# Patient Record
Sex: FEMALE | Race: WHITE | Employment: FULL TIME | URBAN - METROPOLITAN AREA
[De-identification: names, ages, dates, MRNs, and addresses within clinical notes are randomized per-mention and may not be internally consistent; named-entity substitution may affect disease eponyms.]

---

## 2018-10-23 ENCOUNTER — HOSPITAL ENCOUNTER (OUTPATIENT)
Age: 58
Discharge: HOME | End: 2018-10-25
Payer: COMMERCIAL

## 2018-10-23 PROCEDURE — G0123 SCREEN CERV/VAG THIN LAYER: HCPCS

## 2022-08-22 ENCOUNTER — OFFICE VISIT (OUTPATIENT)
Dept: PODIATRY | Age: 62
End: 2022-08-22
Payer: COMMERCIAL

## 2022-08-22 VITALS — BODY MASS INDEX: 29.88 KG/M2 | WEIGHT: 175 LBS | HEIGHT: 64 IN

## 2022-08-22 DIAGNOSIS — M79.672 LEFT FOOT PAIN: ICD-10-CM

## 2022-08-22 DIAGNOSIS — M77.41 METATARSALGIA OF BOTH FEET: ICD-10-CM

## 2022-08-22 DIAGNOSIS — M79.671 RIGHT FOOT PAIN: ICD-10-CM

## 2022-08-22 DIAGNOSIS — G57.81 NEUROMA OF THIRD INTERSPACE OF RIGHT FOOT: Primary | ICD-10-CM

## 2022-08-22 DIAGNOSIS — M77.42 METATARSALGIA OF BOTH FEET: ICD-10-CM

## 2022-08-22 DIAGNOSIS — R26.2 DIFFICULTY WALKING: ICD-10-CM

## 2022-08-22 PROCEDURE — 99203 OFFICE O/P NEW LOW 30 MIN: CPT | Performed by: PODIATRIST

## 2022-08-22 PROCEDURE — 3017F COLORECTAL CA SCREEN DOC REV: CPT | Performed by: PODIATRIST

## 2022-08-22 PROCEDURE — G8417 CALC BMI ABV UP PARAM F/U: HCPCS | Performed by: PODIATRIST

## 2022-08-22 PROCEDURE — G8427 DOCREV CUR MEDS BY ELIG CLIN: HCPCS | Performed by: PODIATRIST

## 2022-08-22 PROCEDURE — 1036F TOBACCO NON-USER: CPT | Performed by: PODIATRIST

## 2022-08-22 RX ORDER — LEVOTHYROXINE SODIUM 88 MCG
TABLET ORAL
COMMUNITY
Start: 2022-08-12

## 2022-08-22 RX ORDER — FLUOXETINE 20 MG/1
TABLET ORAL
COMMUNITY

## 2022-08-22 NOTE — PROGRESS NOTES
Patient is here for bilateral foot pain. Patient states pain in worse in right foot. Patient states pain for about 4 months.  Sabi Gonzales MD  Electronically signed by Nany Carrillo LPN on 2/21/0375 at 3:02 PM

## 2022-08-22 NOTE — PROGRESS NOTES
22     Addis Balloon    : 1960 Sex: female   Age: 60 y.o. Patient was referred by: None  Patient's PCP/Provider is:  Sabi Gonzales MD    Subjective:    Patient seen today for evaluation regarding bilateral forefoot pain. Chief Complaint   Patient presents with    Foot Pain     Bilateral foot pain        HPI: Patient stated issues been going on for several months now. She denies any recent injuries or changes in activities. Patient has tried different shoe gear with minimal improvement in symptoms. Presented today to discuss other potential treatment options available. No other additional abnormalities noted. ROS:  Const: Positives and pertinent negatives as per HPI. Musculo: Denies symptoms other than stated above. Neuro: Denies symptoms other than stated above. Skin: Denies symptoms other than stated above. Current Medications:    Current Outpatient Medications:     SYNTHROID 88 MCG tablet, , Disp: , Rfl:     FLUoxetine HCl, PMDD, 20 MG TABS, fluoxetine 20 mg tablet  Take 1 tablet every day by oral route., Disp: , Rfl:     Allergies:  No Known Allergies    Vitals:    22 1653   Weight: 175 lb (79.4 kg)   Height: 5' 4\" (1.626 m)        No past medical history on file. No family history on file. No past surgical history on file. Social History     Tobacco Use    Smoking status: Never    Smokeless tobacco: Never   Substance Use Topics    Alcohol use: Never    Drug use: Never           Diagnostic studies:    No results found. Procedures:    None    Exam:  VASCULAR: Pedal pulses palpable bilateral foot. CFT brisk digits 1 through 5 bilateral foot. NEUROLOGICAL: Epicritic sensations intact and symmetrical.  Mild paresthesias noted into the right forefoot area with medial/lateral compression performed. DERMATOLOGICAL: No edema or ecchymotic skin changes noted right forefoot region. No plantar calluses or heel fissuring noted bilateral foot.   MUSCULOSKELETAL: Adequate range of motion digital regions bilateral foot. Mild antalgic gait noted upon evaluation. Plan Per Assessment  Asiya Uribe was seen today for foot pain. 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 No acute osseous abnormalities noted. Did discuss conservative care options at this time. Did recommend purchasing OTC insoles with metatarsal head relief. Discussed additional orthopedic sandals to utilize as well to relieve current symptoms. Patient will be followed up at a later date for continued evaluation and management. Seen By:    Sridhar Cuello DPM    Electronically signed by Sridhar Cuello DPM on 8/22/2022 at 5:13 PM      This note was created using voice recognition software. The note was reviewed however may contain grammatical errors.

## 2022-09-26 ENCOUNTER — OFFICE VISIT (OUTPATIENT)
Dept: PODIATRY | Age: 62
End: 2022-09-26
Payer: COMMERCIAL

## 2022-09-26 VITALS — WEIGHT: 175 LBS | HEIGHT: 64 IN | BODY MASS INDEX: 29.88 KG/M2

## 2022-09-26 DIAGNOSIS — M65.9 SYNOVITIS OF LEFT ANKLE: ICD-10-CM

## 2022-09-26 DIAGNOSIS — M77.42 METATARSALGIA OF BOTH FEET: ICD-10-CM

## 2022-09-26 DIAGNOSIS — G57.81 NEUROMA OF THIRD INTERSPACE OF RIGHT FOOT: Primary | ICD-10-CM

## 2022-09-26 DIAGNOSIS — M77.41 METATARSALGIA OF BOTH FEET: ICD-10-CM

## 2022-09-26 DIAGNOSIS — R26.2 DIFFICULTY WALKING: ICD-10-CM

## 2022-09-26 DIAGNOSIS — R60.0 LOCALIZED EDEMA: ICD-10-CM

## 2022-09-26 PROCEDURE — 3017F COLORECTAL CA SCREEN DOC REV: CPT | Performed by: PODIATRIST

## 2022-09-26 PROCEDURE — 29580 STRAPPING UNNA BOOT: CPT | Performed by: PODIATRIST

## 2022-09-26 PROCEDURE — 99213 OFFICE O/P EST LOW 20 MIN: CPT | Performed by: PODIATRIST

## 2022-09-26 PROCEDURE — 1036F TOBACCO NON-USER: CPT | Performed by: PODIATRIST

## 2022-09-26 PROCEDURE — G8417 CALC BMI ABV UP PARAM F/U: HCPCS | Performed by: PODIATRIST

## 2022-09-26 PROCEDURE — G8427 DOCREV CUR MEDS BY ELIG CLIN: HCPCS | Performed by: PODIATRIST

## 2022-09-26 NOTE — PROGRESS NOTES
Patient is here for follow up powersteps and spenco. Patient states no much improvement. Patient has concerns about left ankle. Pain for about 2 weeks with no injury.  Shannon Ricardo MD  Electronically signed by Fareed Cordova LPN on 0/54/1411 at 8:11 PM

## 2022-09-26 NOTE — PROGRESS NOTES
22     Bridgette Pacheco    : 1960   Sex: female    Age: 60 y.o. Patient's PCP/Provider is:  Marge Koch MD    Subjective:  Patient is seen today for follow-up regarding continued neuroma symptoms right foot. She stated the OTC insoles and current shoe gear have helped modestly with her symptoms. Noticed some increased swelling and pain to the lateral heel and sinus tarsi region recently as well. She denies any recent injury to both lower extremities. No other abnormalities noted today. Chief Complaint   Patient presents with    Follow-up     Powersteps and spenco     Foot Pain     Left ankle pain and swelling        ROS:  Const: Positives and pertinent negatives as per HPI. Musculo: Denies symptoms other than stated above. Neuro: Denies symptoms other than stated above. Skin: Denies symptoms other than stated above. Current Medications:    Current Outpatient Medications:     SYNTHROID 88 MCG tablet, , Disp: , Rfl:     FLUoxetine HCl, PMDD, 20 MG TABS, fluoxetine 20 mg tablet  Take 1 tablet every day by oral route., Disp: , Rfl:     Allergies:  No Known Allergies    Vitals:    22 1537   Weight: 175 lb (79.4 kg)   Height: 5' 4\" (1.626 m)       Exam:  Neurovascular status unchanged. Paresthesias noted into digits 2 through 4 right foot. No splaying of the digital regions noted right foot. Tenderness and edema noted left lower extremity hindfoot and ankle region. No ecchymotic skin changes present left lower extremity. Tenderness noted with active range of motion along the lateral peroneal tendons and sinus tarsi region left. Mild antalgic gait noted. Diagnostic Studies:     No results found. Procedures: An unna boot  compressive wrap was applied to the left lower extremity. It's purpose is to  decrease  the amount of edema present, and to allow proper healing of the soft tissues.   The patient was instructed to keep the unna boot clean, dry and intact until the next follow up visit. The patient was instructed to look for signs of redness, irritation, blistering and pain. If these or any other symptoms were to develop, they were advised to contact the office immediately for reevaluation. Plan Per Assessment  Truman Meraz was seen today for follow-up and foot pain. 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 Compression dressing applied symptomatic left lower extremity to reduce current synovitis issues left lower extremity. Additional shoe gear recommendations were discussed with patient today. Patient is to continue with her normal daily activities, oral anti-inflammatories, and OTC treatment options regarding paresthesias right foot. Patient will be followed up at a later date if any further Podiatric issues arise. Seen By:    Eloise Banda DPM    Electronically signed by Eloise Banda DPM on 9/26/2022 at 4:00 PM    This note was created using voice recognition software. The note was reviewed however may contain grammatical errors.

## 2022-10-03 ENCOUNTER — OFFICE VISIT (OUTPATIENT)
Dept: PODIATRY | Age: 62
End: 2022-10-03
Payer: COMMERCIAL

## 2022-10-03 VITALS — WEIGHT: 175 LBS | BODY MASS INDEX: 29.88 KG/M2 | HEIGHT: 64 IN

## 2022-10-03 DIAGNOSIS — M65.9 SYNOVITIS OF LEFT ANKLE: Primary | ICD-10-CM

## 2022-10-03 DIAGNOSIS — R26.2 DIFFICULTY WALKING: ICD-10-CM

## 2022-10-03 PROCEDURE — 99213 OFFICE O/P EST LOW 20 MIN: CPT | Performed by: PODIATRIST

## 2022-10-03 PROCEDURE — 3017F COLORECTAL CA SCREEN DOC REV: CPT | Performed by: PODIATRIST

## 2022-10-03 PROCEDURE — G8484 FLU IMMUNIZE NO ADMIN: HCPCS | Performed by: PODIATRIST

## 2022-10-03 PROCEDURE — G8417 CALC BMI ABV UP PARAM F/U: HCPCS | Performed by: PODIATRIST

## 2022-10-03 PROCEDURE — G8427 DOCREV CUR MEDS BY ELIG CLIN: HCPCS | Performed by: PODIATRIST

## 2022-10-03 PROCEDURE — 1036F TOBACCO NON-USER: CPT | Performed by: PODIATRIST

## 2022-10-03 NOTE — PROGRESS NOTES
10/3/22     Carlo Hopper    : 1960   Sex: female    Age: 60 y.o. Patient's PCP/Provider is:  Holden Ash MD    Subjective:  Patient is seen today for follow-up regarding continued care synovitis issues left lower extremity. Overall patient stated the compression dressing did help alleviate her symptoms. Still having some mild neuroma issues to both feet but stated the insoles and shoe gear have been helping with those symptoms as well. No other additional abnormalities noted. Chief Complaint   Patient presents with    Foot Pain     Left foot        ROS:  Const: Positives and pertinent negatives as per HPI. Musculo: Denies symptoms other than stated above. Neuro: Denies symptoms other than stated above. Skin: Denies symptoms other than stated above. Current Medications:    Current Outpatient Medications:     SYNTHROID 88 MCG tablet, , Disp: , Rfl:     FLUoxetine HCl, PMDD, 20 MG TABS, fluoxetine 20 mg tablet  Take 1 tablet every day by oral route., Disp: , Rfl:     Allergies:  No Known Allergies    Vitals:    10/03/22 1546   Weight: 175 lb (79.4 kg)   Height: 5' 4\" (1.626 m)       Exam:  Neurovascular status unchanged. Increased range of motion noted left ankle and subtalar joint region. Edematous issues resolved sinus tarsi region left lower extremity. Neuroma issues stable to both forefoot regions. Stable gait noted upon evaluation. Diagnostic Studies:     No results found. Procedures:    None    Plan Per Assessment  Thuan Vergara was seen today for foot pain. Diagnoses and all orders for this visit:    Synovitis of left ankle    Difficulty walking      Evaluation and management  We did discuss continued treatment options with patient in detail today. Did recommend continued use of her OTC insoles and supportive shoe gear with all ambulatory activities. Patient will be followed up at a later date for continued evaluation and management.       Seen By:    Peter Gay Yuly Calix DPM    Electronically signed by Morenita Ribera DPM on 10/3/2022 at 4:20 PM    This note was created using voice recognition software. The note was reviewed however may contain grammatical errors.

## 2022-10-03 NOTE — PROGRESS NOTES
Patient is in today for 1 week left foot pain. Patient says the wrap did help and the pain has gone down.  Pcp Is Dora Ward MD  Last ov 8/24/22

## 2024-08-21 ENCOUNTER — OFFICE VISIT (OUTPATIENT)
Dept: SURGERY | Age: 64
End: 2024-08-21
Payer: COMMERCIAL

## 2024-08-21 VITALS
TEMPERATURE: 97.1 F | HEART RATE: 105 BPM | WEIGHT: 180 LBS | HEIGHT: 63 IN | BODY MASS INDEX: 31.89 KG/M2 | SYSTOLIC BLOOD PRESSURE: 152 MMHG | DIASTOLIC BLOOD PRESSURE: 74 MMHG

## 2024-08-21 DIAGNOSIS — L98.9 SKIN LESION: Primary | ICD-10-CM

## 2024-08-21 PROCEDURE — 99202 OFFICE O/P NEW SF 15 MIN: CPT | Performed by: PLASTIC SURGERY

## 2024-08-21 PROCEDURE — G8400 PT W/DXA NO RESULTS DOC: HCPCS | Performed by: PLASTIC SURGERY

## 2024-08-21 PROCEDURE — 1123F ACP DISCUSS/DSCN MKR DOCD: CPT | Performed by: PLASTIC SURGERY

## 2024-08-21 PROCEDURE — G8427 DOCREV CUR MEDS BY ELIG CLIN: HCPCS | Performed by: PLASTIC SURGERY

## 2024-08-21 PROCEDURE — 3017F COLORECTAL CA SCREEN DOC REV: CPT | Performed by: PLASTIC SURGERY

## 2024-08-21 PROCEDURE — G8417 CALC BMI ABV UP PARAM F/U: HCPCS | Performed by: PLASTIC SURGERY

## 2024-08-21 PROCEDURE — 1036F TOBACCO NON-USER: CPT | Performed by: PLASTIC SURGERY

## 2024-08-21 PROCEDURE — 1090F PRES/ABSN URINE INCON ASSESS: CPT | Performed by: PLASTIC SURGERY

## 2024-08-21 RX ORDER — VALSARTAN 320 MG/1
320 TABLET ORAL DAILY
COMMUNITY

## 2024-08-21 NOTE — PROGRESS NOTES
Department of Plastic Surgery - Adult  Attending Consult Note      CHIEF COMPLAINT:  Lesion of nose     History Obtained From:  patient    HISTORY OF PRESENT ILLNESS:                The patient is a 64 y.o. female who presents with nasal lesion.  The patient states that they first noticed the lesion several months ago.  It has  grown in size since they first noticed the lesion.  The lesion has  changed in color and has  been growing.  The pt has not had the lesion biopsied previously.  The patient has not had the lesion removed previously. The patient states the lesion is at times painfull.  The pt denies any associated symptoms.      Past Medical History:    Past Medical History:   Diagnosis Date    Cancer (HCC) 2005    thyroid    HTN (hypertension)      Past Surgical History:    Past Surgical History:   Procedure Laterality Date    THYROIDECTOMY       Current Medications:      Current Outpatient Medications   Medication Sig Dispense Refill    valsartan (DIOVAN) 320 MG tablet Take 1 tablet by mouth daily      SYNTHROID 88 MCG tablet       FLUoxetine HCl, PMDD, 20 MG TABS fluoxetine 20 mg tablet   Take 1 tablet every day by oral route.       No current facility-administered medications for this visit.     Allergies:  Patient has no known allergies.    Social History:   Social History     Socioeconomic History    Marital status:      Spouse name: Not on file    Number of children: Not on file    Years of education: Not on file    Highest education level: Not on file   Occupational History    Not on file   Tobacco Use    Smoking status: Never    Smokeless tobacco: Never   Substance and Sexual Activity    Alcohol use: Never    Drug use: Never    Sexual activity: Not on file   Other Topics Concern    Not on file   Social History Narrative    Not on file     Social Determinants of Health     Financial Resource Strain: Not on file   Food Insecurity: Not on file   Transportation Needs: Not on file   Physical

## 2024-08-27 ENCOUNTER — TELEPHONE (OUTPATIENT)
Dept: SURGERY | Age: 64
End: 2024-08-27

## 2024-10-25 ENCOUNTER — PROCEDURE VISIT (OUTPATIENT)
Dept: SURGERY | Age: 64
End: 2024-10-25
Payer: COMMERCIAL

## 2024-10-25 VITALS — TEMPERATURE: 97 F

## 2024-10-25 DIAGNOSIS — L98.9 SKIN LESION: Primary | ICD-10-CM

## 2024-10-25 PROCEDURE — 12051 INTMD RPR FACE/MM 2.5 CM/<: CPT | Performed by: PLASTIC SURGERY

## 2024-10-25 PROCEDURE — NBSRV NON-BILLABLE SERVICE: Performed by: PLASTIC SURGERY

## 2024-10-25 PROCEDURE — 11441 EXC FACE-MM B9+MARG 0.6-1 CM: CPT | Performed by: PLASTIC SURGERY

## 2024-10-25 RX ORDER — LIDOCAINE HYDROCHLORIDE AND EPINEPHRINE 10; 10 MG/ML; UG/ML
10 INJECTION, SOLUTION INFILTRATION; PERINEURAL ONCE
Status: COMPLETED | OUTPATIENT
Start: 2024-10-25 | End: 2024-10-25

## 2024-10-25 RX ADMIN — LIDOCAINE HYDROCHLORIDE AND EPINEPHRINE 10 ML: 10; 10 INJECTION, SOLUTION INFILTRATION; PERINEURAL at 11:45

## 2024-10-25 NOTE — PROGRESS NOTES
Subjective:    Follow up today for excisional biopsy mid lower forehead lesion of uncertain behavior. Denies fever, nausea, vomiting, leg pain or swelling.  The patient voices understanding of the procedure they are having today and would like to proceed. Patient states that the mass has been painful and growing.    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 The risks included but not limited to pain, bleeding, infection, heavy scarring, damage to surrounding structures, fluid collections, asymmetry, and need for further procedures. All of Her questions were answered to their satisfaction and She agrees to proceed with the procedure.      -After consent was obtained, the area was cleansed with an alcohol swab. Local anesthesia consisting of 1% lidocaine with 1:100,000 epinepherine was injected  surrounding the area. The local was allowed to work.  Using a 10-blade the Mid lower forehead lesion of uncertain behavior was excised,  Intermediate  repair was performed.  The wound(s) were closed with 5-0 Monocryl and 5-0 fast absorbing gut suture , hemostasis was obtained and a dressing was placed over the wound.  The procedure was performed by Dr Moy Stephens    Please schedule an appointment to be seen on Follow-up with our office in 7-14 days  Diet: regular diet  Activity: no heavy lifting for 7 days.   Shower/Bathing: OK to shower over the wound site in 48 hours.  No baths, hot tubs or soaking of the wound site at this time.  Pt voices understanding.     Wound care:   Bacitracin will need to be placed over the wound

## 2024-10-29 NOTE — PROGRESS NOTES
Subjective:    Follow up today from excisional biopsy mid forehead subcutaneous mass. Denies fever, nausea, vomiting, leg pain or swelling, pain is absent.  The pt states that they have  kept the wound site(s) covered as directed.    They state that their pain is absent.    Objective:    There were no vitals taken for this visit.      Facial Wound: Clean, dry, and intact, no drainage.  No signs of infection, wound healing well.   Neuro- Sensation  intact to delores incisional site with light touch . Cranial nerves II-XII grossly intact.     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  Pt voices understanding      Dressing -discontinue bacitracin at this time.  Showering at this time -okay to shower over the wound site.    I informed the patient that he can begin scar massage to the area in another 2 weeks.    Pt was instructed on scar massage  Scar Care Instructions      Sunscreen Recommendations for Scars  We recommend that all patients use a sunscreen when outside but especially on new scars (that are exposed to sunlight) for a year after their procedure.   The SPF should be at least 35. Follow the application directions on the bottle.   Why is it so Important to Use Sunscreen on Scars?  A scar is new and more fragile than the surrounding skin.   If you do not use sunscreen, the scar line will react differently to the sun than the surrounding skin.   If you

## 2024-10-30 LAB — SURGICAL PATHOLOGY REPORT: NORMAL

## 2024-11-06 ENCOUNTER — OFFICE VISIT (OUTPATIENT)
Dept: SURGERY | Age: 64
End: 2024-11-06

## 2024-11-06 VITALS
TEMPERATURE: 97.5 F | SYSTOLIC BLOOD PRESSURE: 114 MMHG | RESPIRATION RATE: 20 BRPM | DIASTOLIC BLOOD PRESSURE: 70 MMHG | OXYGEN SATURATION: 95 % | HEART RATE: 60 BPM

## 2024-11-06 DIAGNOSIS — L98.9 SKIN LESION: Primary | ICD-10-CM

## 2024-11-06 PROCEDURE — 99024 POSTOP FOLLOW-UP VISIT: CPT | Performed by: PLASTIC SURGERY

## 2025-04-14 ENCOUNTER — HOSPITAL ENCOUNTER (OUTPATIENT)
Age: 65
Discharge: HOME | End: 2025-04-16

## 2025-04-23 LAB — SURGICAL PATHOLOGY REPORT: NORMAL
